# Patient Record
Sex: MALE | Race: OTHER | Employment: UNEMPLOYED | URBAN - METROPOLITAN AREA
[De-identification: names, ages, dates, MRNs, and addresses within clinical notes are randomized per-mention and may not be internally consistent; named-entity substitution may affect disease eponyms.]

---

## 2017-04-18 ENCOUNTER — HOSPITAL ENCOUNTER (EMERGENCY)
Facility: HOSPITAL | Age: 47
Discharge: HOME | End: 2017-04-18
Attending: EMERGENCY MEDICINE

## 2017-04-18 VITALS
WEIGHT: 168 LBS | SYSTOLIC BLOOD PRESSURE: 144 MMHG | DIASTOLIC BLOOD PRESSURE: 80 MMHG | RESPIRATION RATE: 19 BRPM | HEART RATE: 88 BPM | TEMPERATURE: 98 F | OXYGEN SATURATION: 97 %

## 2017-04-18 DIAGNOSIS — K29.00 ACUTE GASTRITIS WITHOUT HEMORRHAGE, UNSPECIFIED GASTRITIS TYPE: Primary | ICD-10-CM

## 2017-04-18 PROCEDURE — 80048 BASIC METABOLIC PNL TOTAL CA: CPT | Performed by: EMERGENCY MEDICINE

## 2017-04-18 PROCEDURE — 93010 ELECTROCARDIOGRAM REPORT: CPT | Performed by: EMERGENCY MEDICINE

## 2017-04-18 PROCEDURE — 96374 THER/PROPH/DIAG INJ IV PUSH: CPT

## 2017-04-18 PROCEDURE — 99285 EMERGENCY DEPT VISIT HI MDM: CPT

## 2017-04-18 PROCEDURE — 80076 HEPATIC FUNCTION PANEL: CPT | Performed by: EMERGENCY MEDICINE

## 2017-04-18 PROCEDURE — 85025 COMPLETE CBC W/AUTO DIFF WBC: CPT | Performed by: EMERGENCY MEDICINE

## 2017-04-18 PROCEDURE — 93005 ELECTROCARDIOGRAM TRACING: CPT

## 2017-04-18 PROCEDURE — 84484 ASSAY OF TROPONIN QUANT: CPT | Performed by: EMERGENCY MEDICINE

## 2017-04-18 RX ORDER — PHENYTOIN SODIUM 100 MG/1
300 CAPSULE, EXTENDED RELEASE ORAL ONCE
Status: COMPLETED | OUTPATIENT
Start: 2017-04-18 | End: 2017-04-18

## 2017-04-18 RX ORDER — LEVETIRACETAM 500 MG/1
500 TABLET ORAL DAILY
Status: ON HOLD | COMMUNITY
End: 2018-05-01

## 2017-04-18 RX ORDER — PHENYTOIN SODIUM 100 MG/1
200 CAPSULE, EXTENDED RELEASE ORAL 2 TIMES DAILY
Qty: 100 CAPSULE | Refills: 0 | Status: ON HOLD | OUTPATIENT
Start: 2017-04-18 | End: 2017-06-08

## 2017-04-18 RX ORDER — LEVETIRACETAM 500 MG/1
500 TABLET ORAL ONCE
Status: COMPLETED | OUTPATIENT
Start: 2017-04-18 | End: 2017-04-18

## 2017-04-18 RX ORDER — ONDANSETRON 4 MG/1
4 TABLET, ORALLY DISINTEGRATING ORAL EVERY 4 HOURS PRN
Qty: 10 TABLET | Refills: 0 | Status: SHIPPED | OUTPATIENT
Start: 2017-04-18 | End: 2017-04-25

## 2017-04-18 RX ORDER — ONDANSETRON 2 MG/ML
4 INJECTION INTRAMUSCULAR; INTRAVENOUS ONCE
Status: COMPLETED | OUTPATIENT
Start: 2017-04-18 | End: 2017-04-18

## 2017-04-18 RX ORDER — PHENYTOIN 125 MG/5ML
300 SUSPENSION ORAL ONCE
Status: DISCONTINUED | OUTPATIENT
Start: 2017-04-18 | End: 2017-04-18

## 2017-04-18 RX ORDER — PANTOPRAZOLE SODIUM 20 MG/1
20 TABLET, DELAYED RELEASE ORAL DAILY
Qty: 30 TABLET | Refills: 0 | Status: SHIPPED | OUTPATIENT
Start: 2017-04-18 | End: 2017-05-18

## 2017-04-18 NOTE — ED PROVIDER NOTES
Patient Seen in: [facility] Emergency Department    History   Patient presents with:  Nausea/Vomiting/Diarrhea (gastrointestinal)    Stated Complaint:     The history is provided by the patient and a relative.        Is a 17-year-old male in the em for seizures, weakness and headaches. Psychiatric/Behavioral: Negative for suicidal ideas and agitation. Positive for stated complaint:   Other systems are as noted in HPI. Constitutional and vital signs reviewed.       All other systems reviewed a Phosphatase 101 (*)     Bilirubin, Direct 0.3 (*)     All other components within normal limits   CBC W/ DIFFERENTIAL - Abnormal; Notable for the following:     RBC 4.11 (*)     HCT 40.7 (*)     MCH 34.2 (*)     PLT 86 (*)     Neutrophil Absolute 7.9 (*) visit.     Follow-up:  Giovanni Matias MD  [address] [facility], [address]  [place] [address] [phone]    Call in 2 days        Medications Prescribed:  Current Discharge Medication List    START taking these medications    ondansetron 4 MG O

## 2017-04-18 NOTE — ED INITIAL ASSESSMENT (HPI)
N/v x24 hours. Pt denies abdominal or chest pain. He states he feels \"warm\" at times, with no documented fever.

## 2017-05-02 NOTE — ED PROVIDER NOTES
Pharmacy called for Rx verification on 17. Rx called in for dilantin extended 30 m tabs in am, 3 tabs in evening as originally written by dr mcallister.

## 2017-06-07 ENCOUNTER — APPOINTMENT (OUTPATIENT)
Dept: GENERAL RADIOLOGY | Facility: HOSPITAL | Age: 47
DRG: 896 | End: 2017-06-07
Attending: EMERGENCY MEDICINE

## 2017-06-07 PROBLEM — F10.239 ALCOHOL WITHDRAWAL (HCC): Status: ACTIVE | Noted: 2017-06-07

## 2017-06-07 PROBLEM — K85.20 ALCOHOL-INDUCED ACUTE PANCREATITIS WITHOUT INFECTION OR NECROSIS: Status: ACTIVE | Noted: 2017-06-07

## 2017-06-07 PROBLEM — R78.89 DILANTIN LEVEL TOO LOW: Status: ACTIVE | Noted: 2017-06-07

## 2017-06-07 PROBLEM — F10.230 ALCOHOL WITHDRAWAL, UNCOMPLICATED (HCC): Status: ACTIVE | Noted: 2017-06-07

## 2017-06-07 PROCEDURE — 71010 XR CHEST AP PORTABLE  (CPT=71010): CPT | Performed by: EMERGENCY MEDICINE

## 2017-06-07 NOTE — ED PROVIDER NOTES
Patient Seen in: [facility] AND [facility] Emergency Department    History   Patient presents with:  Seizure Disorder (neurologic)    Stated Complaint: Seizure like activity    HPI    is here with his wife they are concerned that he is going to have a seizure. Pulse 06/07/17 1537 123   Resp 06/07/17 1537 28   Temp 06/07/17 1537 98.1 °F (36.7 °C)   Temp src 06/07/17 1537 Oral   SpO2 06/07/17 1537 98 %   O2 Device 06/07/17 1537 None (Room air)       Current:/78 mmHg  Pulse 85  Temp(Src) 98.1 °F (36.7 °C) ( Notable for the following:     Glucose 117 (*)     Sodium 135 (*)     Potassium 3.2 (*)     CO2 16 (*)      (*)     Bilirubin, Total 1.9 (*)     Total Protein 8.7 (*)     Globulin 4.6 (*)     A/G Ratio 0.9 (*)     Anion Gap 20 (*)     BUN/CREA Ratio level too low  Alcohol-induced acute pancreatitis without infection or necrosis    Disposition:  There is no disposition on file for this visit.     Follow-up:  Brianne Davila MD  [address] [place] [address]  [phone]    In 2

## 2017-06-07 NOTE — CONSULTS
REFERRING PHYSICIAN: Dr. Lang ref. provider found    HPI:         Thank you very much for requesting me to see the patient. hx per chart. Pt seen in er.      As you know, Dm Christensen is a 50year old male who presents today to ER - -was brought to ER by family Gastroenterology.   ___________________________________________________________  #

## 2017-06-07 NOTE — H&P
[facility] Hospitalist H&P       CC: Patient presents with:  Seizure Disorder (neurologic)       PCP: None Pcp    ASSESSMENT / PLAN:    Patient is a 50year old male who is Luxembourgish speaking with a [facility] sig for alcohol dependence, HTN, TBI with some short term camilo episode of tremor today (no LOC, did not look like \"full seizure\" no loss bowel or bladder) and EMS called at that time. Last drink today. +chronic diarrhea at baseline.        PMH  Past Medical History   Diagnosis Date   • Essential hypertension    • TBI GFRNAA  >60   CA  9.2   NA  135*   K  3.2*   CL  99   CO2  16*       Lab Results  Component Value Date   WBC 6.8 06/07/2017   HGB 14.5 06/07/2017   HCT 41.4 06/07/2017   PLT 68 06/07/2017   CREATSERUM 0.91 06/07/2017   BUN 9 06/07/2017    06/07/201

## 2017-06-08 ENCOUNTER — APPOINTMENT (OUTPATIENT)
Dept: ULTRASOUND IMAGING | Facility: HOSPITAL | Age: 47
DRG: 896 | End: 2017-06-08
Attending: INTERNAL MEDICINE

## 2017-06-08 PROCEDURE — 76705 ECHO EXAM OF ABDOMEN: CPT | Performed by: INTERNAL MEDICINE

## 2017-06-08 NOTE — PLAN OF CARE
Problem: Patient/Family Goals  Goal: Patient/Family Long Term Goal  Patient’s Long Term Goal: no more seizures    Interventions:   Take medications as instructed  - See additional Care Plan goals for specific interventions   Outcome: Progressing  Take medic Monitor swallowing and airway patency with patient fatigue and changes in neurological status  - Encourage and assist patient to increase activity and self care with guidance from PT/OT  - Encourage visually impaired, hearing impaired and aphasic patients injury  INTERVENTIONS:  - Assess pt frequently for physical needs  - Identify cognitive and physical deficits and behaviors that affect risk of falls.   - [place] fall precautions as indicated by assessment.  - Educate pt/family on patient safety includin understanding and response  - Establish method for patient to ask for assistance (call light)  - Provide an  as needed  - Communicate barriers and strategies to overcome with those who interact with patient   Outcome: Progressing  Patient is [place]

## 2017-06-08 NOTE — PROGRESS NOTES
[facility] Hospitalist Progress Note     CC: Hospital Follow up    PCP: None Pcp, can likely follow-up at [facility] [place]       Assessment/Plan:   Patient is a 50year old male who is Faroese speaking with a [facility] sig for alcohol dependence, HTN, TBI with some short pending clinical course    Patient and/or patient's family given opportunity to ask questions and note understanding and agree with therapeutic plan as outlined    Niesha Chandra DO    [facility] Hospitalist  Answering Service number: [phone]     Subjective: AST  107*  77*   ALB  4.1  3.5         Imaging:  Xr Chest Ap Portable  (cpt=71010)    6/7/2017  CONCLUSION: No acute cardiopulmonary abnormality.            Meds:     • levETIRAcetam  500 mg Intravenous Q24H   • phenytoin  200 mg Intravenous QAM   • pheny

## 2017-06-08 NOTE — PLAN OF CARE
Problem: METABOLIC/FLUID AND ELECTROLYTES - ADULT  Goal: Glucose maintained within prescribed range  INTERVENTIONS:  - Monitor Blood Glucose as ordered  - Assess for signs and symptoms of hyperglycemia and hypoglycemia  - Administer ordered medications to devices as appropriate  - Consider OT/PT consult to assist with strengthening/mobility  - Encourage toileting schedule  Outcome: Progressing    Problem: DISCHARGE PLANNING  Goal: Discharge to home or other facility with appropriate resources  INTERVENTIONS knowledge, values, beliefs, and cultural backgrounds into the planning and delivery of care  - Encourage patient/family to participate in care and decision-making at the level they choose  - Honor patient and family perspectives and choices  Outcome: Progr

## 2017-06-08 NOTE — PROGRESS NOTES
GI  PROGRESS NOTE    SUBJECTIVE: denies abd pain; tolerating diet.        OBJECTIVE:  Temp:  [97.8 °F (36.6 °C)-98.7 °F (37.1 °C)] 97.9 °F (36.6 °C)  Pulse:  [] 93  Resp:  [18-26] 18  BP: (119-153)/(71-84) 133/71 mmHg  Exam  Gen: No acute distress,

## 2017-06-09 NOTE — DISCHARGE PLANNING
SW provided pt w/ ETOH resources. Pt's wife to go over resources w/ pt. Pt does have number to [facility] per RN.     [place] , [address]

## 2017-06-09 NOTE — DISCHARGE SUMMARY
General Medicine Discharge Summary     Patient ID:  Juan Carlos Black  50year old  3/23/1970    Admit date: 6/7/2017    Discharge date and time: 06/09/2017    Attending Physician: Kacy Springer MD     Primary Care Physician: None Pcp     Reason for admissi seizure though this is always possible with missed doses of sz meds    Chest pain- atypical, likely due to esophageal pain due to vomiting -- improved  -pt states pain was worse with vomiting, felt like a burning, radiated from epigastric region  -trop and Commonly known as:  PROTONIX   Take 1 tablet (40 mg total) by mouth every morning before breakfast.         CHANGE how you take these medications          Phenytoin Sodium Extended 100 MG Caps   Commonly known as:  TREASURE   What changed:  Another medic

## 2017-06-09 NOTE — PROGRESS NOTES
[facility] Pharmacy Note: Route Optimization for Levetiracetam (KEPPRA)    Patient is currently on Levetiracetam (KEPPRA) 500 mg IV every 24 hours.    The patient meets the criteria to convert to the oral equivalent as established by the IV to Oral conversion prot

## 2017-06-09 NOTE — PLAN OF CARE
Problem: Patient/Family Goals  Goal: Patient/Family Long Term Goal  Patient’s Long Term Goal: no more seizures    Interventions:   Take medications as instructed  - See additional Care Plan goals for specific interventions   Outcome: Progressing  Take medic patency with patient fatigue and changes in neurological status  - Encourage and assist patient to increase activity and self care with guidance from PT/OT  - Encourage visually impaired, hearing impaired and aphasic patients to use assistive/communication frequently for physical needs  - Identify cognitive and physical deficits and behaviors that affect risk of falls.   - [place] fall precautions as indicated by assessment.  - Educate pt/family on patient safety including physical limitations  - Instruct p time for understanding and response  - Establish method for patient to ask for assistance (call light)  - Provide an  as needed  - Communicate barriers and strategies to overcome with those who interact with patient   Outcome: Progressing  Patie

## 2017-06-09 NOTE — PLAN OF CARE
Problem: Patient/Family Goals  Goal: Patient/Family Long Term Goal  Patient’s Long Term Goal: no more seizures    Interventions:   Take medications as instructed  - See additional Care Plan goals for specific interventions   Outcome: Progressing  Goal: Radha visually impaired, hearing impaired and aphasic patients to use assistive/communication devices   Outcome: Progressing    Problem: METABOLIC/FLUID AND ELECTROLYTES - ADULT  Goal: Glucose maintained within prescribed range  INTERVENTIONS:  - Monitor Blood G call for assistance with activity based on assessment  - Modify environment to reduce risk of injury  - Provide assistive devices as appropriate  - Consider OT/PT consult to assist with strengthening/mobility  - Encourage toileting schedule   Outcome: Prog care for you?  - Provide timely, complete, and accurate information to patient/family  - Incorporate patient and family knowledge, values, beliefs, and cultural backgrounds into the planning and delivery of care  - Encourage patient/family to participate i

## 2017-07-27 ENCOUNTER — HOSPITAL ENCOUNTER (EMERGENCY)
Facility: HOSPITAL | Age: 47
Discharge: HOME | End: 2017-07-27
Attending: EMERGENCY MEDICINE

## 2017-07-27 VITALS
SYSTOLIC BLOOD PRESSURE: 118 MMHG | OXYGEN SATURATION: 95 % | DIASTOLIC BLOOD PRESSURE: 75 MMHG | WEIGHT: 132.25 LBS | HEIGHT: 62.99 IN | BODY MASS INDEX: 23.43 KG/M2 | RESPIRATION RATE: 22 BRPM | HEART RATE: 97 BPM | TEMPERATURE: 98 F

## 2017-07-27 DIAGNOSIS — R78.89 DILANTIN LEVEL TOO LOW: ICD-10-CM

## 2017-07-27 DIAGNOSIS — G40.909 SEIZURE DISORDER (HCC): Primary | ICD-10-CM

## 2017-07-27 LAB
ANION GAP SERPL CALC-SCNC: 17 MMOL/L (ref 0–18)
BASOPHILS # BLD: 0 K/UL (ref 0–0.2)
BASOPHILS NFR BLD: 1 %
BUN SERPL-MCNC: 10 MG/DL (ref 8–20)
BUN/CREAT SERPL: 7.7 (ref 10–20)
CALCIUM SERPL-MCNC: 9 MG/DL (ref 8.5–10.5)
CHLORIDE SERPL-SCNC: 99 MMOL/L (ref 95–110)
CO2 SERPL-SCNC: 17 MMOL/L (ref 22–32)
CREAT SERPL-MCNC: 1.3 MG/DL (ref 0.5–1.5)
EOSINOPHIL # BLD: 0 K/UL (ref 0–0.7)
EOSINOPHIL NFR BLD: 0 %
ERYTHROCYTE [DISTWIDTH] IN BLOOD BY AUTOMATED COUNT: 14 % (ref 11–15)
GLUCOSE BLDC GLUCOMTR-MCNC: 143 MG/DL (ref 70–99)
GLUCOSE SERPL-MCNC: 126 MG/DL (ref 70–99)
HCT VFR BLD AUTO: 44.5 % (ref 41–52)
HGB BLD-MCNC: 15.5 G/DL (ref 13.5–17.5)
LYMPHOCYTES # BLD: 1.7 K/UL (ref 1–4)
LYMPHOCYTES NFR BLD: 22 %
MCH RBC QN AUTO: 33.7 PG (ref 27–32)
MCHC RBC AUTO-ENTMCNC: 34.9 G/DL (ref 32–37)
MCV RBC AUTO: 96.5 FL (ref 80–100)
MONOCYTES # BLD: 0.6 K/UL (ref 0–1)
MONOCYTES NFR BLD: 8 %
NEUTROPHILS # BLD AUTO: 5.3 K/UL (ref 1.8–7.7)
NEUTROPHILS NFR BLD: 69 %
OSMOLALITY UR CALC.SUM OF ELEC: 277 MOSM/KG (ref 275–295)
PHENYTOIN SERPL-MCNC: <2.5 MCG/ML (ref 10–20)
PLATELET # BLD AUTO: 98 K/UL (ref 140–400)
PMV BLD AUTO: 10.3 FL (ref 7.4–10.3)
POTASSIUM SERPL-SCNC: 3.6 MMOL/L (ref 3.3–5.1)
RBC # BLD AUTO: 4.61 M/UL (ref 4.5–5.9)
SODIUM SERPL-SCNC: 133 MMOL/L (ref 136–144)
WBC # BLD AUTO: 7.8 K/UL (ref 4–11)

## 2017-07-27 PROCEDURE — 85025 COMPLETE CBC W/AUTO DIFF WBC: CPT

## 2017-07-27 PROCEDURE — 99285 EMERGENCY DEPT VISIT HI MDM: CPT

## 2017-07-27 PROCEDURE — 85025 COMPLETE CBC W/AUTO DIFF WBC: CPT | Performed by: EMERGENCY MEDICINE

## 2017-07-27 PROCEDURE — 80185 ASSAY OF PHENYTOIN TOTAL: CPT | Performed by: EMERGENCY MEDICINE

## 2017-07-27 PROCEDURE — 96365 THER/PROPH/DIAG IV INF INIT: CPT

## 2017-07-27 PROCEDURE — 93010 ELECTROCARDIOGRAM REPORT: CPT | Performed by: INTERNAL MEDICINE

## 2017-07-27 PROCEDURE — 82962 GLUCOSE BLOOD TEST: CPT

## 2017-07-27 PROCEDURE — 80048 BASIC METABOLIC PNL TOTAL CA: CPT

## 2017-07-27 PROCEDURE — 93005 ELECTROCARDIOGRAM TRACING: CPT

## 2017-07-27 PROCEDURE — 80048 BASIC METABOLIC PNL TOTAL CA: CPT | Performed by: EMERGENCY MEDICINE

## 2017-07-27 RX ORDER — LORAZEPAM 2 MG/ML
INJECTION INTRAMUSCULAR
Status: COMPLETED
Start: 2017-07-27 | End: 2017-07-27

## 2017-07-27 RX ORDER — PHENYTOIN SODIUM 100 MG/1
100 CAPSULE, EXTENDED RELEASE ORAL 3 TIMES DAILY
Qty: 270 CAPSULE | Refills: 1 | Status: SHIPPED | OUTPATIENT
Start: 2017-07-27 | End: 2017-10-25

## 2017-07-27 RX ORDER — PHENYTOIN SODIUM 100 MG/1
100 CAPSULE, EXTENDED RELEASE ORAL 3 TIMES DAILY
Qty: 20 CAPSULE | Refills: 0 | Status: ON HOLD | OUTPATIENT
Start: 2017-07-27 | End: 2018-05-01

## 2017-07-27 NOTE — ED PROVIDER NOTES
Patient Seen in: [facility] Emergency Department    History   Patient presents with:  Arrythmia/Palpitations (cardiovascular)    Stated Complaint:     HPI    47-year-old male patient with a past medical history significant for seizure disorder pre noted above. PSFH elements reviewed from today and agreed except as otherwise stated in HPI.     Physical Exam   ED Triage Vitals [07/27/17 1333]  BP: (!) 136/103  Pulse: 119  Resp: 22  Temp: 97.7 °F (36.5 °C)  Temp src: Temporal  SpO2: 99 %  O2 Device: Abnormal            Final result                 Please view results for these tests on the individual orders.    RAINBOW DRAW BLUE   RAINBOW DRAW GOLD   RAINBOW DRAW DARK GREEN   RAINBOW DRAW LAVENDER TALL (BNP)     EKG    Rate, intervals and axes as noted

## 2017-07-27 NOTE — ED INITIAL ASSESSMENT (HPI)
Pt to ED c/o palpitations, anxiety and tachycardia. Pt reports he hasn't taken seizure meds in 15 days. Denies ETOH use. Pt reports he feels like he might have a seizure.

## 2018-04-27 ENCOUNTER — APPOINTMENT (OUTPATIENT)
Dept: GENERAL RADIOLOGY | Facility: HOSPITAL | Age: 48
DRG: 392 | End: 2018-04-27
Attending: EMERGENCY MEDICINE
Payer: MEDICAID

## 2018-04-27 PROBLEM — F10.20 ALCOHOLISM (HCC): Status: ACTIVE | Noted: 2018-04-27

## 2018-04-27 PROBLEM — E83.42 HYPOMAGNESEMIA: Status: ACTIVE | Noted: 2018-04-27

## 2018-04-27 PROBLEM — E87.6 HYPOKALEMIA: Status: ACTIVE | Noted: 2018-04-27

## 2018-04-27 PROBLEM — R56.9 ALCOHOL WITHDRAWAL SEIZURE WITHOUT COMPLICATION (HCC): Status: ACTIVE | Noted: 2018-04-27

## 2018-04-27 PROBLEM — Z91.14 H/O MEDICATION NONCOMPLIANCE: Status: ACTIVE | Noted: 2018-04-27

## 2018-04-27 PROBLEM — F10.230 ALCOHOL WITHDRAWAL SEIZURE WITHOUT COMPLICATION (HCC): Status: ACTIVE | Noted: 2018-04-27

## 2018-04-27 PROCEDURE — 71045 X-RAY EXAM CHEST 1 VIEW: CPT | Performed by: EMERGENCY MEDICINE

## 2018-04-27 NOTE — ED NOTES
Pt began to have a seizure while I was in the room. Medications given at this time. Seizure precaution were in place from when he first arrived.

## 2018-04-27 NOTE — CONSULTS
[facility]  Report of GI Consultation    Dia Balderrama Patient Status:  Emergency    3/23/1970 MRN [id number]   Location [address] [facility] Attending Martha Orozco MD   Hosp Day # 0 PCP None Pcp     Date of Admission suffered at least 2 seizures since arrival in the ED today, total of 3 seizures today. Wife states this is very unusual.    GI service is consulted for recent history of frequent daily vomiting.     ED data:    Labs today show normal renal function, CO2 20 Guardian Status:  Not on file.     Other Topics            Concern    None on file    Social History Narrative    None on file            Current Medications:    Current Facility-Administered Medications:  potassium chloride IVPB premix 20 mEq 20 mEq Priscilla Kourtney Chest Ap Portable  (cpt=71045)    Result Date: 4/27/2018  CONCLUSION: No acute cardiopulmonary abnormality.    Dictated by (CST): Gisela Barnett MD on 4/27/2018 at 13:42     Approved by (CST): Gisela Barnett MD on 4/27/2018 at 13:42

## 2018-04-27 NOTE — ED PROVIDER NOTES
Patient Seen in: [facility] Emergency Department    History   Patient presents with:  Fever (infectious)    Stated Complaint: [address]    HPI    Patient presents with complaint of according to his wife she was concerned he might have a seiz noted above. PSFH elements reviewed from today and agreed except as otherwise stated in HPI.     Physical Exam   ED Triage Vitals [04/27/18 1229]  BP: 136/89  Pulse: 103  Resp: 20  Temp: 100.5 °F (38.1 °C)  Temp src: Tympanic  SpO2: 90 %  O2 Device: None no recurrence at this point. Patient has no physician here. Patient later had another seizure he was given 2 more milligrams of IV Ativan. I did discuss with Dr. Dale Feng he accepted patient for admission.   He requested neurology consultation Dr. Evaristo Jim Abnormality         Status                     ---------                               -----------         ------                     CBC W/ DIFFERENTIAL[[id number]]          Abnormal            Final result                 Please view results for

## 2018-04-27 NOTE — CONSULTS
Neurology Inpatient Consult Note    Timmy Shone : 3/23/1970   Referring Physician: Dr. Janae Ramirez  HPI:     Timmy Shone is a 46year old male who is being seen in neurologic evaluation. Patient presented to the emergency room today.   Wife witnessed a throughout (individual muscle strength testing difficult to do due to mental state)  Sensory: Unable to accurately assess  Reflexes: DTRs 2+ in bilateral biceps, brachioradialis, patella  Coordination / gait: Unable to assess    IMAGING / STUDIES:  reviewe

## 2018-04-27 NOTE — ED INITIAL ASSESSMENT (HPI)
SHAKING LIKE A SEIZURE AT HOME PER WIFE, LAST NIGHT VOMITING, TODAY C/O FEELING ILL, + FEVER, NO SEIZURE LIKE ACTIVITY NOW

## 2018-04-27 NOTE — H&P
[facility]    PATIENT'S NAME: Linh Becker PHYSICIAN: Anayeli Lee MD   PATIENT ACCOUNT#:   [id number]    LOCATION:  [place] [address]  MEDICAL RECORD #:   [id number]       YOB: 1970  ADMISSION DATE:       04/27/2018 medications for almost 1 month. He continued to drink 10 to 12 beers a day. Patient has been having intractable nausea and vomiting but he continued to manage to drink alcohol. Other 12-point review of systems is unobtainable.          PHYSICAL EXAMINATI

## 2018-04-27 NOTE — ED NOTES
Witnessed seizure from 7669-6301 : 15L NRB mask placed and pt medicated with 2mg IV Ativan as ordered. Body is rigid and head is hyperextended to the left, eyes rolled back.  Wife at bedside during event - states \"this is unlike any seizure I have ever see

## 2018-04-28 NOTE — PROGRESS NOTES
[facility]    Progress Note    Elizabeth Araujo Patient Status:  Inpatient    3/23/1970 MRN [id number]   Location [facility] 3W/SW Attending Rochelle Martins MD   Hosp Day # 1 PCP None Pcp       Subjective:   Elizabeth Araujo is a(n) 48 year deficits  HEENT: denies nasal congestion, sinus pain or sore throat; hearing loss negative  RESPIRATORY: denies shortness of breath, wheezing or cough   CARDIOVASCULAR: denies chest pain or MARTINEZ; no palpitations   GI: denies nausea, vomiting, constipation,

## 2018-04-28 NOTE — PLAN OF CARE
CARDIOVASCULAR - ADULT    • Maintains optimal cardiac output and hemodynamic stability Progressing    • Absence of cardiac arrhythmias or at baseline Progressing        Integumentary status not within defined limits    • Pt's integumentary status will be a

## 2018-04-28 NOTE — PROGRESS NOTES
[facility]  Hospitalist Progress  Note     Foster Amparo Patient Status:  Inpatient    3/23/1970  46year old CSN [id number]   Location -A Attending Luis Akins MD   Hosp Day # 1 PCP None Pcp     ASSESSMENT/PLAN    Seizures.   Due 34.8  34.8   RDW  15.3*  14.9   WBC  6.1  8.4   PLT  74*  53*     Recent Labs   Lab  04/27/18   1239  04/28/18   0437   GLU  111*  133*   BUN  10  4*   CREATSERUM  0.94  0.87   GFRAA  >60  >60   GFRNAA  >60  >60   CA  8.6  7.7*   ALB  4.0  3.5   NA  138  1

## 2018-04-28 NOTE — PLAN OF CARE
Problem: Patient/Family Goals  Goal: Patient/Family Long Term Goal  Patient's Long Term Goal: neurologic status returns to baseline     Interventions:  - iv fluids, ciwa.  Reorientation   - See additional Care Plan goals for specific interventions    Outcom Encourage broncho-pulmonary hygiene including cough, deep breathe, Incentive Spirometry  - Assess the need for suctioning and perform as needed  - Assess and instruct to report SOB or any respiratory difficulty  - Respiratory Therapy support as indicated patient fatigue and changes in neurological status  - Encourage and assist patient to increase activity and self care with guidance from PT/OT  - Encourage visually impaired, hearing impaired and aphasic patients to use assistive/communication devices   Ou

## 2018-04-29 NOTE — PHYSICAL THERAPY NOTE
Pt order recived and chart reviewed. Per nursing to hold Pt eval today wit pt confused, unable to follow command, unsteady, high fall risk on CIWA protocol. He is given Ativan round the clock. Will follow up tomorrow.

## 2018-04-29 NOTE — PROGRESS NOTES
[facility]  Hospitalist Progress  Note     Sheron Lawrence Patient Status:  Inpatient    3/23/1970  46year old [facility] [id number]   Location -A Attending Solomon Cantu MD   Hosp Day # 2 PCP None Pcp     ASSESSMENT/PLAN    Seizures.   Due 99.1   MCH  34.5*  34.5*  34.7*   MCHC  34.8  34.8  35.0   RDW  15.3*  14.9  14.1   WBC  6.1  8.4  6.2   PLT  74*  53*  46*     Recent Labs   Lab  04/27/18   1239  04/28/18   0437  04/28/18   1533  04/29/18   0549   GLU  111*  133*   --   98   BUN  10  4*

## 2018-04-30 ENCOUNTER — ANESTHESIA EVENT (OUTPATIENT)
Dept: ENDOSCOPY | Facility: HOSPITAL | Age: 48
DRG: 392 | End: 2018-04-30
Payer: MEDICAID

## 2018-04-30 ENCOUNTER — ANESTHESIA (OUTPATIENT)
Dept: ENDOSCOPY | Facility: HOSPITAL | Age: 48
DRG: 392 | End: 2018-04-30
Payer: MEDICAID

## 2018-04-30 ENCOUNTER — SURGERY (OUTPATIENT)
Age: 48
End: 2018-04-30

## 2018-04-30 RX ORDER — LIDOCAINE HYDROCHLORIDE 10 MG/ML
INJECTION, SOLUTION EPIDURAL; INFILTRATION; INTRACAUDAL; PERINEURAL AS NEEDED
Status: DISCONTINUED | OUTPATIENT
Start: 2018-04-30 | End: 2018-04-30 | Stop reason: SURG

## 2018-04-30 RX ADMIN — LIDOCAINE HYDROCHLORIDE 100 MG: 10 INJECTION, SOLUTION EPIDURAL; INFILTRATION; INTRACAUDAL; PERINEURAL at 16:29:00

## 2018-04-30 RX ADMIN — SODIUM CHLORIDE: 9 INJECTION, SOLUTION INTRAVENOUS at 16:25:00

## 2018-04-30 NOTE — ANESTHESIA PREPROCEDURE EVALUATION
Anesthesia PreOp Note    HPI:     Fredy Bennett is a 46year old male who presents for preoperative consultation requested by: Ester Alarcon MD    Date of Surgery: 4/27/2018 - 4/30/2018    Procedure(s):  ESOPHAGOGASTRODUODENOSCOPY (EGD)  Indicati MD PACHECO 1 tablet at 04/30/18 5470   Thiamine HCl tab 100 mg 100 mg Oral Daily Michael BERGMAN  mg at 04/30/18 9412   levETIRAcetam (KEPPRA) tab 750 mg 750 mg Oral BID Michael BERGMAN  mg at 04/30/18 4757   Pantoprazole Sodium (PROTONIX) EC tab 40 m (L) 04/30/2018   MCV 99.5 04/30/2018   MCH 35.1 (H) 04/30/2018   MCHC 35.3 04/30/2018   RDW 14.2 04/30/2018   PLT 42 (L) 04/30/2018   MPV 9.3 04/30/2018       Lab Results  Component Value Date    (L) 04/30/2018   K 3.3 04/30/2018    04/30/2018 risks, major complications, and any alternative forms of anesthetic management. All of the patient's questions were answered to the best of my ability. The patient desires the anesthetic management as planned.   Vernard Ahumada  4/30/2018 4:22 PM

## 2018-04-30 NOTE — OPERATIVE REPORT
EGD PROCEDURE REPORT    DATE OF PROCEDURE:  4/30/2018    PCP: None Pcp     PREOPERATIVE DIAGNOSIS:  Nausea and vomiting; abnormal weight loss     POSTOPERATIVE DIAGNOSIS:  See impression. SURGEON:  Christopher A. Lyell Darner, M.D. Myrene CanavanCaren Brock anest

## 2018-04-30 NOTE — ANESTHESIA POSTPROCEDURE EVALUATION
Patient: Jett Arteaga    Procedure Summary     Date:  04/30/18 Room / Location:  [facility] ENDOSCOPY 01 / [facility] ENDOSCOPY    Anesthesia Start:  0418 Anesthesia Stop:  5477    Procedure:  ESOPHAGOGASTRODUODENOSCOPY (EGD) (N/A ) Diagnosis:  (non-erosive gastritis and

## 2018-04-30 NOTE — PROGRESS NOTES
[facility] - [facility]  Hospitalist Progress  Note     Elsa Aggarwal Patient Status:  Inpatient    3/23/1970  46year old CSN [id number]   Location -A Attending Dee Salas MD   Hosp Day # 3 PCP None Pcp     ASSESSMENT/PLAN    Seizures.   Due 11.0*   HCT  33.3*  33.0*  31.3*   MCV  99.3  99.1  99.5   MCH  34.5*  34.7*  35.1*   MCHC  34.8  35.0  35.3   RDW  14.9  14.1  14.2   WBC  8.4  6.2  5.7   PLT  53*  46*  42*     Recent Labs   Lab  04/28/18   0437   04/29/18   0549  04/29/18   1549  04/30

## 2018-04-30 NOTE — PROGRESS NOTES
[place] Patient Status:  Inpatient    3/23/1970 MRN [id number]   Location [facility] 3W/SW Attending Ace Ross MD   Hosp Day # 3 PCP None Pcp       Subjective:     Awake, seems latanya BILT  1.9*  2.4*   --   2.5*   --    --    TP  8.1  7.5   --   7.2   --    --     < > = values in this interval not displayed.        Recent Labs   Lab  04/27/18   1239   LIP  47       Recent Labs   Lab  04/28/18   0437  04/29/18   0549  04/30/18   0530

## 2018-04-30 NOTE — PHYSICAL THERAPY NOTE
PHYSICAL THERAPY EVALUATION - INPATIENT     Room Number: 332/332-A  Evaluation Date: 4/30/2018  Type of Evaluation: Initial   Physician Order: PT Eval and Treat    Presenting Problem: ETOH withdrawal   seizures   Reason for Therapy: Mobility Dysfunction a Patient's current functional deficits include decrease activity tolerance / decrease balance and need for assisted mobility. Pt with SBA for bed mobility. Pt with SBA to CGA for guiding and lines for transfers.   Pt able to amb without AD  150 ft x 1  30 Patient presented to the emergency room today. Wife witnessed a seizure. Patient has chronic alcoholism and alcohol withdrawal seizures. He is not in any anti epileptics. He was tremulous prior to the seizure.   He had 2 seizures in the ED on presentati Drives:  (does not have a license per spouse can drive )  Patient Owned Equipment: None  Patient Regularly Uses: None    Prior Level of [place]:     pt is Bengali speaking. NO family was present .    Wife Linda Dawson called as pt unable to provide PLF / After activity  BP  126/83  HR  88  O2 sats  99 %     AM-PAC '6-Clicks' INPATIENT SHORT FORM - BASIC MOBILITY  How much difficulty does the patient currently have. ..  -   Turning over in bed (including adjusting bedclothes, sheets and blankets)?: None   - Goal #4 Patient will negotiate 1  stairs/one curb w/ assistive device and supervision   Goal #4   Current Status    Goal #5 Patient to demonstrate independence with home activity/exercise instructions provided to patient in preparation for discharge.    [facility][note 3]

## 2018-04-30 NOTE — PROGRESS NOTES
[place] Patient Status:  Inpatient    3/23/1970 MRN [id number]   Location [facility] 3W/SW Attending Ace Ross MD   Hosp Day # 2 PCP None Pcp       Subjective:     Awake, seems latanya Recent Labs   Lab  04/27/18   1239  04/28/18   0437  04/29/18   0549   MG  1.4*  1.9  1.7*   PHOS   --    --   2.3*       No results for input(s): URINE, CULTI, BLDSMR in the last 72 hours.               Assessment and Plan:     47-year-old gentleman

## 2018-04-30 NOTE — CM/SW NOTE
4/30/18 CM Discharge planning  Spoke with RN,requested order for psych liaison to eval pt and assist  for ETOH resources.   James Blake X [phone]

## 2018-05-01 NOTE — PLAN OF CARE
Maintains optimal cardiac output and hemodynamic stability Adequate for Discharge      Absence of cardiac arrhythmias or at baseline Adequate for Discharge      Pt's integumentary status will be adequate for discharge Adequate for Discharge      Achieves s

## 2018-05-01 NOTE — DISCHARGE SUMMARY
[place] HOSPITALIST  DISCHARGE SUMMARY     Mireya Lucero Patient Status:  Inpatient    3/23/1970 MRN [id number]   Location [facility] 3W/SW Attending No att. providers found   [address] Day # 4 PCP None Pcp     DATE OF ADMISSION: 2018  DATE OF DIS EXAM:  Temp:  [98 °F (36.7 °C)-99.5 °F (37.5 °C)] 98.3 °F (36.8 °C)  Pulse:  [60-75] 75  Resp:  [15-20] 18  BP: ()/(52-82) 124/80  Gen: A+Ox3. No distress. HEENT: NCAT, neck supple, no carotid bruit. CV: RRR, S1S2, and intact distal pulses.  [place] [address]    Go in 2 days  to have your Keppra level checked    [facility]essica [facility]  [phone]    In 1 week  Post Discharge Followup    The above plan and follow-up instructions were reviewed with the patiparish

## 2018-05-11 ENCOUNTER — MED REC SCAN ONLY (OUTPATIENT)
Dept: GASTROENTEROLOGY | Facility: CLINIC | Age: 48
End: 2018-05-11

## 2019-08-15 ENCOUNTER — HOSPITAL ENCOUNTER (OUTPATIENT)
Dept: GENERAL RADIOLOGY | Age: 49
Discharge: HOME | End: 2019-08-15
Attending: EMERGENCY MEDICINE
Payer: MEDICAID

## 2019-08-15 ENCOUNTER — LAB ENCOUNTER (OUTPATIENT)
Dept: LAB | Age: 49
End: 2019-08-15
Attending: EMERGENCY MEDICINE
Payer: MEDICAID

## 2019-08-15 ENCOUNTER — APPOINTMENT (OUTPATIENT)
Dept: LAB | Age: 49
End: 2019-08-15
Attending: EMERGENCY MEDICINE
Payer: MEDICAID

## 2019-08-15 DIAGNOSIS — Z00.00 WELL ADULT EXAM: Primary | ICD-10-CM

## 2019-08-15 DIAGNOSIS — Z00.00 ROUTINE GENERAL MEDICAL EXAMINATION AT A HEALTH CARE FACILITY: ICD-10-CM

## 2019-08-15 DIAGNOSIS — R56.9 SEIZURE (HCC): ICD-10-CM

## 2019-08-15 LAB
ALBUMIN SERPL-MCNC: 4.3 G/DL (ref 3.4–5)
ALBUMIN/GLOB SERPL: 1.1 {RATIO} (ref 1–2)
ALP LIVER SERPL-CCNC: 149 U/L (ref 45–117)
ALT SERPL-CCNC: 40 U/L (ref 16–61)
ANION GAP SERPL CALC-SCNC: 6 MMOL/L (ref 0–18)
AST SERPL-CCNC: 20 U/L (ref 15–37)
BACTERIA UR QL AUTO: NEGATIVE /HPF
BASOPHILS # BLD AUTO: 0.01 X10(3) UL (ref 0–0.2)
BASOPHILS NFR BLD AUTO: 0.3 %
BILIRUB SERPL-MCNC: 0.5 MG/DL (ref 0.1–2)
BILIRUB UR QL: NEGATIVE
BUN BLD-MCNC: 10 MG/DL (ref 7–18)
BUN/CREAT SERPL: 8.8 (ref 10–20)
CALCIUM BLD-MCNC: 9.6 MG/DL (ref 8.5–10.1)
CHLORIDE SERPL-SCNC: 110 MMOL/L (ref 98–112)
CHOLEST SMN-MCNC: 137 MG/DL (ref ?–200)
CLARITY UR: CLEAR
CO2 SERPL-SCNC: 27 MMOL/L (ref 21–32)
COLOR UR: COLORLESS
COMPLEXED PSA SERPL-MCNC: 0.76 NG/ML (ref ?–4)
CREAT BLD-MCNC: 1.13 MG/DL (ref 0.7–1.3)
DEPRECATED RDW RBC AUTO: 52.9 FL (ref 35.1–46.3)
EOSINOPHIL # BLD AUTO: 0.15 X10(3) UL (ref 0–0.7)
EOSINOPHIL NFR BLD AUTO: 4.2 %
ERYTHROCYTE [DISTWIDTH] IN BLOOD BY AUTOMATED COUNT: 15.2 % (ref 11–15)
GLOBULIN PLAS-MCNC: 4 G/DL (ref 2.8–4.4)
GLUCOSE BLD-MCNC: 85 MG/DL (ref 70–99)
GLUCOSE UR-MCNC: NEGATIVE MG/DL
HCT VFR BLD AUTO: 38.8 % (ref 39–53)
HDLC SERPL-MCNC: 64 MG/DL (ref 40–59)
HGB BLD-MCNC: 13.2 G/DL (ref 13–17.5)
HGB UR QL STRIP.AUTO: NEGATIVE
IMM GRANULOCYTES # BLD AUTO: 0 X10(3) UL (ref 0–1)
IMM GRANULOCYTES NFR BLD: 0 %
KETONES UR-MCNC: NEGATIVE MG/DL
LDLC SERPL CALC-MCNC: 65 MG/DL (ref ?–100)
LEUKOCYTE ESTERASE UR QL STRIP.AUTO: NEGATIVE
LYMPHOCYTES # BLD AUTO: 1 X10(3) UL (ref 1–4)
LYMPHOCYTES NFR BLD AUTO: 27.9 %
M PROTEIN MFR SERPL ELPH: 8.3 G/DL (ref 6.4–8.2)
MCH RBC QN AUTO: 32.2 PG (ref 26–34)
MCHC RBC AUTO-ENTMCNC: 34 G/DL (ref 31–37)
MCV RBC AUTO: 94.6 FL (ref 80–100)
MONOCYTES # BLD AUTO: 0.38 X10(3) UL (ref 0.1–1)
MONOCYTES NFR BLD AUTO: 10.6 %
NEUTROPHILS # BLD AUTO: 2.05 X10 (3) UL (ref 1.5–7.7)
NEUTROPHILS # BLD AUTO: 2.05 X10(3) UL (ref 1.5–7.7)
NEUTROPHILS NFR BLD AUTO: 57 %
NITRITE UR QL STRIP.AUTO: NEGATIVE
NONHDLC SERPL-MCNC: 73 MG/DL (ref ?–130)
OSMOLALITY SERPL CALC.SUM OF ELEC: 294 MOSM/KG (ref 275–295)
PATIENT FASTING: YES
PATIENT FASTING: YES
PH UR: 8 [PH] (ref 5–8)
PLATELET # BLD AUTO: 79 10(3)UL (ref 150–450)
POTASSIUM SERPL-SCNC: 3.9 MMOL/L (ref 3.5–5.1)
PROT UR-MCNC: NEGATIVE MG/DL
RBC # BLD AUTO: 4.1 X10(6)UL (ref 4.3–5.7)
RBC #/AREA URNS AUTO: 0 /HPF
SODIUM SERPL-SCNC: 143 MMOL/L (ref 136–145)
SP GR UR STRIP: 1 (ref 1–1.03)
TRIGL SERPL-MCNC: 40 MG/DL (ref 30–149)
TSI SER-ACNC: 0.93 MIU/ML (ref 0.36–3.74)
UROBILINOGEN UR STRIP-ACNC: <2
VIT C UR-MCNC: NEGATIVE MG/DL
VLDLC SERPL CALC-MCNC: 8 MG/DL (ref 0–30)
WBC # BLD AUTO: 3.6 X10(3) UL (ref 4–11)
WBC #/AREA URNS AUTO: <1 /HPF

## 2019-08-15 PROCEDURE — 93005 ELECTROCARDIOGRAM TRACING: CPT

## 2019-08-15 PROCEDURE — 84443 ASSAY THYROID STIM HORMONE: CPT

## 2019-08-15 PROCEDURE — 71046 X-RAY EXAM CHEST 2 VIEWS: CPT | Performed by: EMERGENCY MEDICINE

## 2019-08-15 PROCEDURE — 93010 ELECTROCARDIOGRAM REPORT: CPT | Performed by: EMERGENCY MEDICINE

## 2019-08-15 PROCEDURE — 81001 URINALYSIS AUTO W/SCOPE: CPT

## 2019-08-15 PROCEDURE — 80061 LIPID PANEL: CPT

## 2019-08-15 PROCEDURE — 80053 COMPREHEN METABOLIC PANEL: CPT

## 2019-08-15 PROCEDURE — 36415 COLL VENOUS BLD VENIPUNCTURE: CPT

## 2019-08-15 PROCEDURE — 85025 COMPLETE CBC W/AUTO DIFF WBC: CPT

## 2020-11-13 ENCOUNTER — LAB ENCOUNTER (OUTPATIENT)
Dept: LAB | Age: 50
End: 2020-11-13
Attending: EMERGENCY MEDICINE
Payer: MEDICAID

## 2020-11-13 ENCOUNTER — APPOINTMENT (OUTPATIENT)
Dept: LAB | Age: 50
End: 2020-11-13
Attending: EMERGENCY MEDICINE
Payer: MEDICAID

## 2020-11-13 ENCOUNTER — HOSPITAL ENCOUNTER (OUTPATIENT)
Dept: GENERAL RADIOLOGY | Age: 50
Discharge: HOME | End: 2020-11-13
Attending: EMERGENCY MEDICINE
Payer: MEDICAID

## 2020-11-13 DIAGNOSIS — R56.9 SEIZURE (HCC): Primary | ICD-10-CM

## 2020-11-13 DIAGNOSIS — R44.0 HEARING VOICES: ICD-10-CM

## 2020-11-13 DIAGNOSIS — R56.9 SEIZURE (HCC): ICD-10-CM

## 2020-11-13 PROCEDURE — 36415 COLL VENOUS BLD VENIPUNCTURE: CPT

## 2020-11-13 PROCEDURE — 80185 ASSAY OF PHENYTOIN TOTAL: CPT

## 2020-11-13 PROCEDURE — 80053 COMPREHEN METABOLIC PANEL: CPT

## 2020-11-13 PROCEDURE — 93010 ELECTROCARDIOGRAM REPORT: CPT | Performed by: EMERGENCY MEDICINE

## 2020-11-13 PROCEDURE — 81003 URINALYSIS AUTO W/O SCOPE: CPT

## 2020-11-13 PROCEDURE — 84443 ASSAY THYROID STIM HORMONE: CPT

## 2020-11-13 PROCEDURE — 80186 ASSAY OF PHENYTOIN FREE: CPT

## 2020-11-13 PROCEDURE — 93005 ELECTROCARDIOGRAM TRACING: CPT

## 2020-11-13 PROCEDURE — 85025 COMPLETE CBC W/AUTO DIFF WBC: CPT

## 2020-11-13 PROCEDURE — 80061 LIPID PANEL: CPT

## 2020-11-13 PROCEDURE — 84439 ASSAY OF FREE THYROXINE: CPT

## 2020-11-13 PROCEDURE — 82306 VITAMIN D 25 HYDROXY: CPT
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
## 2021-01-11 NOTE — ED INITIAL ASSESSMENT (HPI)
Pt with hx liver disease. Wife called EMS due to patient having paranoid thoughts. Thinking people are buglerizing his home and his neighbors. Pt locked himself in this room.

## 2021-01-11 NOTE — ED PROVIDER NOTES
Patient Seen in: [facility] AND [facility] Emergency Department      History   Patient presents with:  Eval-P    Stated Complaint: psych    HPI/Subjective:   HPI    73-year-old male brought by EMS from home after reportedly patient was agitated and paranoid some tenderness. Neurological: Alert and oriented to person, place, and time. Clear speech, no focality, no dysarthria  Skin: Skin is warm and dry. Psychiatric: Normal mood and affect.   Behavior is normal.  Patient is not anxious and does not seem depresse Authorization. The authorized Fact Sheet for Healthcare Providers for this assay is available upon request from the laboratory. CBC WITH DIFFERENTIAL WITH PLATELET    Narrative:      The following orders were created for panel order CBC WITH DIFFERENTI Addendum added March 10, 2021 2300 for chart patient.   EKG dated January 12, 2020 1003 8 hours shows a sinus rate of 69 with evidence of right axis deviation without other acute ischemic changes with preserved intervals and no signifi

## 2021-01-12 NOTE — ED NOTES
Report given to faisal IBARRA of [facility], pt was accepted by Dr Мария Castro and will be going to room 356 bed 2

## 2021-01-12 NOTE — ED NOTES
Called security for pt's cell phone, pt home # [phone], pt reports wife's # [phone], updated crisis worker.

## 2021-01-12 NOTE — ED NOTES
Patient has been accepted to [facility] under Dr. Ayana Chand. Patient will be going to room 356, bed 2.     RN report can be given at [phone] and transportation can be arranged after report has been given.

## 2021-01-12 NOTE — ED NOTES
Per ED Tech, pt stating camera's are watching him. Pt A&Ox2, requiring more direction at this time, cooperative.
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
## 2021-01-12 NOTE — ED NOTES
Patient ambulated self to bathroom with steady gait and back into cart without incident. Will continue to monitor.

## 2021-01-12 NOTE — ED NOTES
Transfer Summary     MOE - Jason Saas - no answer  [facility] - left voicemail  [address] accepted the clinicals and is requesting and EKG be completed. Packet will be faxed when EKG results are available.

## 2021-01-12 NOTE — ED NOTES
Patient transferred to [facility] at this time per [facility] ambulance, belongings sent with patient, patient remains stable at time of transfer

## 2021-01-12 NOTE — ED NOTES
Lico the cell phone number listed for the patient's wife:  [phone]. The number rings and then disconnects. Called the home number listed: [phone]. The call goes into a message that says the voice mail is full.    Unable to leave a messag

## 2021-02-11 ENCOUNTER — APPOINTMENT (OUTPATIENT)
Dept: GENERAL RADIOLOGY | Facility: HOSPITAL | Age: 51
End: 2021-02-11
Attending: EMERGENCY MEDICINE
Payer: MEDICAID

## 2021-02-11 PROCEDURE — 73562 X-RAY EXAM OF KNEE 3: CPT | Performed by: EMERGENCY MEDICINE

## 2021-02-12 PROBLEM — F20.0 PARANOID SCHIZOPHRENIA, CHRONIC CONDITION WITH ACUTE EXACERBATION (HCC): Status: ACTIVE | Noted: 2021-02-12
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
## 2021-02-12 NOTE — ED NOTES
Patient transported to [facility] with [facility] and EMS staff. Vitals stable at time of ED departure. All patient belongings included in transport.

## 2021-02-12 NOTE — ED NOTES
Patient endorsed to me by [facility] RN for continuity of care. Patient is asleep and appears comfortable, breakfast tray at bedside.    1:1 Seclusion observation continued by Aguila Barrios ED PCT

## 2021-02-12 NOTE — ED NOTES
Report given to [address] RN  Patient accepted at [facility].   Dr. Harsha Martinez is accepting physician

## 2021-02-12 NOTE — ED PROVIDER NOTES
Patient endorsed to me by Dr. Ana Thomas for continuation of care - pt awaiting inpt psych transfer for delusions and has been calm throughout my shift. Pt endorsed to Dr. Denia Palacios for continuation of care.

## 2021-02-12 NOTE — ED NOTES
Spoke with pt wife Dayo Bush. Per Fatou Boggs, pt just got discharged from [facility] on 2/2/2021. Pt has been hospitalized twice recently for behavioral health. Per Fatou Boggs pt has been increasingly paranoid.  Fatou Boggs states that [facility]
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
## 2021-02-12 NOTE — CERTIFICATION
Ref: [facility] 5/3-403, 5/3-602, 5/3-607, 5/3-610    5/3-702, 5/3-813, 5/4-306, 5/4-402, 5/4-403    5/4-405, 5/4-501, 5/4-611, 1/0-815   Inpatient Certificate  Re: Britany Jorge    (name)     I personally informed the above-named individu his or her behavioral history, to suffer mental or emotional deterioration and is reasonably expected, after such deterioration, to meet the criteria of either paragraph one or paragraph two above;   []  An individual who is developmentally disabled and un

## 2021-02-12 NOTE — ED PROVIDER NOTES
Patient Seen in: [facility] AND [facility] Emergency Department      History   Patient presents with:  Eval-P    Stated Complaint: psych    HPI/Subjective:   HPI  Patient is a 58-year-old male history of alcohol abuse, cirrhosis, hyperammonemia on lactulose, sei drinks      Types: 6 Cans of beer per week    Drug use: Never             Review of Systems    Positive for stated complaint: psych  Other systems are as noted in HPI. Constitutional and vital signs reviewed.       All other systems reviewed and negative e baseline. Psychiatric:      Comments:  Thought content does not appear normal, appears paranoid and delusional.           ED Course     Labs Reviewed   URINALYSIS WITH CULTURE REFLEX - Abnormal; Notable for the following components:       Result Value PCR (GENEXPERT)       X-ray left knee    IMPRESSION:    No acute fracture or malalignment. 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 Nonspecific calcification in the soft tissues along the lateral aspect of the distal femur is noted, nonspecific.   No signi
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
## 2021-02-13 NOTE — CONSULTS
[facility]    PATIENT'S NAME: Kristopher Reese CHRISTINA   ATTENDING PHYSICIAN: Dion Morales MD   CONSULTING PHYSICIAN: Katey Oconnell MD   PATIENT ACCOUNT#:   [id number]    LOCATION:  [place] [address]  MEDICAL RECORD #:   [id number] ideation. The patient apparently has been going into multiple psychiatric admissions and the patient has not been compliant with treatment and management. The patient was in a psychiatric hospital in January and in December.   The patient today denied marnie syndrome. Rule out Wernicke's syndrome for patient having heavy alcohol abuse.       PLAN:  Discussed risks and benefits, acknowledging that the patient has a serious condition of medical and psychological impairment causing him to have excessive delusiona
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
## 2021-02-25 NOTE — ED NOTES
Called back [facility]. Spoke to Tina Every. She reports that they never received the packet. Re-faxed along with success fax sheet from prior faxing.

## 2021-02-25 NOTE — ED NOTES
Assumed pt care at this time, pt updated on plan of care, pt denies discomforts. Pt in view of RN, call light within reach.

## 2021-02-25 NOTE — BH LEVEL OF CARE ASSESSMENT
Crisis Evaluation Assessment    Olegario Dennis YOB: 1970   Age 44year old MRN [id number]   Location [address] Attending Mahin Whyte MD      Patient's legal sex: male  Patient identifies as: ma Firearm/Weapon: No  Discussion of Removal of Firearm/Weapon: N/A  Do you have a firearm owner ID card?: No  Collateral for any access to means/firearms/weapons: No collateral present    Protective Factors:   Protective Factors: Unknown at this time    [facility] hosptial bed)  Abnormal movements: None  Posture: Slouched;Relaxed; Other (comment)(Pt in hospital bed)  Rate of Movement: Normal  Mood and Affect  Mood or Feelings: Other (comment)(Euthymic)  Appropriateness of Affect: Congruent to mood; Appropriate to situ Schizophrenia      Secondary Psychiatric Diagnoses  Deferred at this time      Pervasive Diagnoses  None      Pertinent Non-Psychiatric Diagnoses  Cirrhosis   Hyperammonemia on Lactulose   Seizure Disorder  Hypertension         Seth Nguyễn, [facility]

## 2021-02-25 NOTE — ED PROVIDER NOTES
Patient Seen in: [facility] AND [facility] Emergency Department      History   Patient presents with:  Eval-P    Stated Complaint: hallucinations     HPI/Subjective:   HPI    73-year-old male with history of hypertension, psoriasis, lactulose, seizures, TBI, her signs reviewed. All other systems reviewed and negative except as noted above.     Physical Exam     ED Triage Vitals [02/24/21 2113]   BP (!) 139/94   Pulse 88   Resp 18   Temp 98.1 °F (36.7 °C)   Temp src Oral   SpO2 99 %   O2 Device None (Room air) Sodium 141 136 - 145 mmol/L    Potassium 3.4 (L) 3.5 - 5.1 mmol/L    Chloride 110 98 - 112 mmol/L    CO2 29.0 21.0 - 32.0 mmol/L    Anion Gap 2 0 - 18 mmol/L    BUN 9 7 - 18 mg/dL    Creatinine 1.13 0.70 - 1.30 mg/dL    BUN/CREA Ratio 8.0 (L) 10.0 - 20.0 Neutrophil Absolute Prelim 4.78 1.50 - 7.70 x10 (3) uL    Neutrophil Absolute 4.78 1.50 - 7.70 x10(3) uL    Lymphocyte Absolute 1.55 1.00 - 4.00 x10(3) uL    Monocyte Absolute 0.66 0.10 - 1.00 x10(3) uL    Eosinophil Absolute 0.13 0.00 - 0.70 x10(3) uL history, performing the physical exam and reviewing the diagnostics, multiple initial diagnoses were considered based on the presenting problem including psychosis, hepatic encephalopathy, delusions.                      Disposition and Plan     Clinical Im

## 2021-02-25 NOTE — ED NOTES
Spoke to Jesse, the wife of J Carlos Hill. She reports that J Carlos Hill has been hospitalized psychiatrically 3 times:  12/20 at [facility] in [place], [place] 1/21 and [facility] 2/21. J Carlos Hill was discharged from [facility] on 2/19/21.   He is currently prescribed [place] Basim Balderas stated that [place] to people telepathically. He states he is having conversations with people in [facility] and can tell what people are saying. I will go to talk to him and he tells me to shut up that he is having a conversation with someone\".

## 2021-02-25 NOTE — ED NOTES
Met with Olegario. Reviewed Rights of Recipients Receiving [facility] [place] [facility], utilizing the language line. Diana,  # [id number] provided the translation. [facility] was willing to sign the rights sheet.   He disagrees with the recommendation for inpatient hosp

## 2021-02-25 NOTE — ED NOTES
Call back from Manjula Conti with [facility] in [facility]. Phone number provided for faxing is actually her call back number. Re-faxed to corrected fax number.

## 2021-02-25 NOTE — ED NOTES
Attempted to call pt's wife again. Called [phone] and it states the person's mailbox is full and then hangs up.  Called [phone] and again it rang twice then went to busy signal.

## 2021-02-25 NOTE — ED NOTES
LATE ENTRY:  While utilizing Diana with the language line earlier, writer inquired whether Pamela Owen was okay with is wife being advised where he is admitted and he stated that would be fine.

## 2021-02-25 NOTE — ED PROVIDER NOTES
Patient signed out to me from previous medical team.    Patient is a 49-year-old male here for hallucinations and inappropriate behavior.      10:22 AM Medically cleared awaiting crisis eval.     1:29 PM  Patient evaluated by crisis and petition Alia Phi

## 2021-02-25 NOTE — ED NOTES
Called patient's pharmacy to review patient's medications. Medications updated.     [address]

## 2021-02-25 NOTE — CM/SW NOTE
Called and spoke with patient's wife Barber Elliott on her cell#[phone]. Informed her patient ready for discharge - Barber Elliott requested to speak with Dr. Katt Demarco.  Obtained correct ph#'s from wife goldie herself and for patient and had Kurt Rogers in registration update p

## 2021-02-25 NOTE — ED NOTES
Call from [facility] with [facility] in [facility]. She advised that she has an accepting MD.  However, the packet still has to be reviewed by their nurse.

## 2021-02-25 NOTE — ED NOTES
Emailed P [employer] C, Rights of Recipients Receiving [address] and  note to [facility] due to failed fax receipt.

## 2021-02-25 NOTE — ED NOTES
Ham Morales in [facility]. Spoke to [facility]. Waiting for medical clearance note and packet will be faxed.

## 2021-02-25 NOTE — ED NOTES
Pt asking for his cloths to leave, Pt informed we need to speak to his wife in order to continue with our care plan. Will attempt to reach the wife again. Pt redirected into the room without issue. Pt sitting on the edge of his cart, appears calm.   Radha Ellison

## 2021-02-25 NOTE — ED NOTES
Called back [facility]. Spoke to William. She confirmed they received the fax. She advised that there are a couple referrals ahead of Olegario to be reviewed.

## 2021-02-25 NOTE — ED NOTES
Attempted to call pt's wife again. Called [phone] and it states the person's mailbox is full and then hangs up.  Called [phone] and again it rang twice then went to busy signal.

## 2021-02-25 NOTE — ED NOTES
Scanned P & C,  Rights, and failed fax receipt to [facility] AND [facility] into [facility]

## 2021-02-25 NOTE — ED NOTES
Faxed Referral packet to [facility] in [facility] for review.

## 2021-02-25 NOTE — ED NOTES
Pt now awake. Stating that his back is in pain. MD notified in order to have pain medication ordered. Pt is redirectable back to the bed, calm and cooperative.

## 2021-02-25 NOTE — ED NOTES
Faxed P & C and Rights of Individuals receiving MH Services to [facility] AND [facility].

## 2021-02-25 NOTE — ED NOTES
3:00 PM  RN left vm for patient on home number requesting call back to complete Mohs pre-op assessment. RN will follow up. Tran at bedside speaking with pt about inpatient plan.

## 2021-02-25 NOTE — ED INITIAL ASSESSMENT (HPI)
Call back from [facility] with [facility] in [facility]. They are unable to accomodate on their unit.

## 2021-02-25 NOTE — ED NOTES
This tech taking over seclusion at this time. Pt currently sleeping on the cart. Will offer additional comfort measures when the pt wakes up.

## 2021-02-25 NOTE — ED NOTES
Called back [facility] to [place]. She advised that they are not currently accepting any new admissions due to plumbing issues that are being worked on and first need to be resolved.

## 2021-02-25 NOTE — ED NOTES
Attempted to call pt's wife for collateral. Her number rings twice and then goes to a busy signal. Called 2nd time and same thing happened. Attempted to call the house number. States the mailbox is currently full and cannot accept new messages.

## 2021-02-26 NOTE — ED NOTES
Updated P&C sent to [address] [place] [address]. They are just waiting to hear from [facility] in [place] when we can send the pt. Will call back.

## 2021-02-26 NOTE — CONSULTS
[facility]    Report of Consultation    Charlene Lei Patient Status:  Emergency    3/23/1970 MRN [id number]   Location [address] Attending Armani Mcdonough MD   [address] Day # 0 PCP Alina Rosales oriented to the date, place and condition demonstrating lack of dementia diagnosis but is mostly increased paranoia and bizarre behavior.     Since last admission we indicated in our report that the patient would benefit from injectable medication for the p Smoker        Packs/day: 0.50        Types: Cigarettes      Smokeless tobacco: Never Used    Alcohol use: Not Currently      Alcohol/week: 6.0 standard drinks      Types: 6 Cans of beer per week    Drug use: Never          Current Medications:    •  ARIpip denying any suicidal or homicidal ideation. The patient denying any auditory or visual hallucination. The patient is treating persecutory delusional ideation accusing neighbor and demonstrating bizarre and aggressive behavior with them.   Cognition is ion (GeneXpert) Once      Meds This Visit:  Requested Prescriptions      No prescriptions requested or ordered in this encounter           Khoa Sagastume MD  2/25/2021

## 2021-02-26 NOTE — ED NOTES
Spoke with Geni Burns at [employer] patient accept at [facility] in [place]  room 180-8

## 2021-02-26 NOTE — ED NOTES
Received call from [facility] at [facility], would like [facility] for today. Made liason aware. Will be expecting call for RN to RN report.

## 2021-02-26 NOTE — ED NOTES
[facility] at bedside, original [facility] given to transport. Security called for pt belongings.

## 2021-02-26 NOTE — CERTIFICATION
Ref: [facility] 5/3-403, 5/3-602, 5/3-607, 5/3-610    5/3-702, 5/3-813, 5/4-306, 5/4-402, 5/4-403    5/4-405, 5/4-501, 5/4-611, 4/9-701   Inpatient Certificate  Re: Eleazar Martinez    (name)     I personally informed the above-named individu his or her behavioral history, to suffer mental or emotional deterioration and is reasonably expected, after such deterioration, to meet the criteria of either paragraph one or paragraph two above;   []  An individual who is developmentally disabled and un

## 2021-03-03 NOTE — ED NOTES
Received a phone call from [facility] [employer] with [facility]. Completed Change of Status Form. Emailed it to [facility] and scanned it in [place].

## 2021-04-07 NOTE — BH LEVEL OF CARE ASSESSMENT
Crisis Evaluation Assessment    Olegario Bedollatomaden Mora YOB: 1970   Age 46year old MRN [id number]   Location [facility] Attending Ching Goyal MD      Patient's legal sex: male  Patient identifies as: your experience of thoughts of dying by suicide:  (n/a)  Protective Factors: n/a  Past Suicidal Ideation: Denies    Family History or Personal Lived Experience of Loss or Near Loss by Suicide: Denies     Non-Suicidal Self-Injury:   none    Access to Means: Clear  Cognition  Concentration: Impaired  Memory: Recent memory intact; Remote memory intact  Orientation Level: Oriented to person;Oriented to place; Disoriented to time;Disoriented to situation  Insight: Poor  Fair/poor insight as evidenced by: evidenced

## 2021-04-07 NOTE — ED PROVIDER NOTES
Patient Seen in: [facility] AND [facility] Emergency Department      History   Patient presents with:  Eval-P    Stated Complaint: psych    HPI/Subjective:   HPI    13-year-old male with history of alcohol abuse, cirrhosis, hyperammonemia on lactulose, hypertens negative except as noted above.     Physical Exam     ED Triage Vitals [04/07/21 0903]   /88   Pulse 79   Resp 20   Temp 97.8 °F (36.6 °C)   Temp src Oral   SpO2 97 %   O2 Device None (Room air)       Current:/78   Pulse 76   Temp 97.8 °F (36.6 healthcare provider. Signs and symptoms of respiratory viral infection due to SARS-CoV-2, influenza, and RSV can be similar. Test performed using the Xpert Xpress SARS-CoV-2/FLU/RSV assay on the [address] [place] [address], [address] [place] [address].    This
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
## 2021-04-08 NOTE — ED QUICK NOTES
RN to RN report given to Wilber Hernández from [facility]. Accepting MD Olivia Hernández.  They are requesting transport at 7pm.

## 2021-04-08 NOTE — ED NOTES
The following facilities were contacted for in-patient:    [address] [place] [address] to follow-up with referral status. [facility] was unable to accommodate due to medical acuity. [facility]- No beds available.     [facility]-  There is not an Intensive Treatment Bed

## 2021-04-08 NOTE — ED NOTES
Met with Olegario, utilizing the language line. Initially, Jennifer Mora (( # [id number]) was providing the translation. However,  The connection was lost, so had to call back and start the process over.    Marcia Mane # [id number] provided the translation

## 2021-04-08 NOTE — ED PROVIDER NOTES
Patient signed out to me from previous medical team.      Patient is a 49-year-old male with multiple medical problems presenting with paranoia and hallucinations. Petition certification filled out. Pending inpatient transfer.     EKG    Rate, intervals a

## 2021-04-08 NOTE — ED NOTES
Olegario's wife called to inquire about disposition/  Verneita Commander, [facility]  # [phone], asked Christludivina Brothers for permission to inform his wife that he was transferring to [facility] gave verbal permission to update his wife regarding the transfer.   Call

## 2021-04-08 NOTE — ED NOTES
[place] a call from [facility] stating that they are unable to accommodate as there will be no bed availability.

## 2021-04-08 NOTE — ED QUICK NOTES
Pt informed, through , of impending transfer to [facility]. Pt gave permission for wife to be informed. Pt also asked if someone could make sure someone could take care of the place he was staying. Pt was ok with having his wife do this.

## 2021-04-08 NOTE — ED NOTES
Spoke to [facility] in [facility] Intake at [facility].  Referal packet faxed. Hardy David RN on request for an EKG. Will fax the EKG when it is available.

## 2021-04-08 NOTE — ED NOTES
[facility] AND [facility] HEALTH [place]. Left a message requesting a call back about a bed request.

## 2021-04-08 NOTE — ED NOTES
Received a call from [facility] asking to speak with Olegario's Nurse. Provided phone number so that Nurse to Nurse can be completed.

## 2021-04-08 NOTE — ED QUICK NOTES
Pt received and ate dinner tray. Pt now laying in bed with blankets. Pt is calm and cooperative at this time.

## 2021-04-08 NOTE — ED NOTES
The following facilities were contacted for in-patient placement:    [facility]-Unable to accept referrals from outside [facility] at this time. [facility]-Referral made over the phone. Referral Packet faxed for review.     [facility]

## 2021-04-08 NOTE — ED NOTES
Spoke with Dimas Paget, Jose's Nurse, to confirm that Nurse to Nurse was completed with [address] [facility] informed Discharge  that Nurse to Nurse was completed. Jolane Friday was accepted to [facility] under Dr. Mayte Carlton.  Asad stated that transportatio

## 2021-04-09 NOTE — CONSULTS
[facility]    Report of Consultation    Roman Lei Patient Status:  Emergency    3/23/1970 MRN [id number]   Location [address] Attending Ronald Ashford MD   [facility] Day # 0 PCP Guerline Vazquez for inpatient mission for safety and stabilization.     Patient has been in our emergency room multiple times and I have been documenting that patient will not be compliant on medication and he need injectable antipsychotic medication for that I have starte wife, patient did not             tolerate and was told by a provider to stop             taking.  Unclear if true allergy or not      Review of Systems:     As by Admitting/Attending    Results:     Laboratory Data:  Lab Results   Component Value Date    W aggression and refusing medication.       Impression:     Impression:     Paranoid schizophrenia, chronic condition with acute exacerbation.     The patient with a chronic history of paranoid schizophrenia who has not been compliant to the medication and ha

## 2021-04-09 NOTE — CERTIFICATION
Ref: [facility] 5/3-403, 5/3-602, 5/3-607, 5/3-610    5/3-702, 5/3-813, 5/4-306, 5/4-402, 5/4-403    5/4-405, 5/4-501, 5/4-611, 2/4-820   Inpatient Certificate  Re: Modesto Gutiérrez    (name)     I personally informed the above-named individu his or her behavioral history, to suffer mental or emotional deterioration and is reasonably expected, after such deterioration, to meet the criteria of either paragraph one or paragraph two above;   []  An individual who is developmentally disabled and un

## 2021-04-18 NOTE — ED NOTES
Petition, Certificate, and Rights of Individuals were emailed to [facility] AND [facility]. The same documents were scanned into Epic.

## 2021-04-18 NOTE — ED PROVIDER NOTES
Patient Seen in: [facility] AND [facility] Emergency Department    History   Patient presents with:  Eval-P      HPI    49-year-old male presents the ER for psychiatric evaluation. Patient with a past medical history of traumatic brain injury.   Patient has been b positives to the presenting problem noted.     Physical Exam     ED Triage Vitals [04/18/21 0102]   /79   Pulse 66   Resp 18   Temp 98.2 °F (36.8 °C)   Temp src Temporal   SpO2 96 %   O2 Device None (Room air)       All measures to prevent infection t Value    Potassium 3.1 (*)     All other components within normal limits   HEPATIC FUNCTION PANEL (7) - Abnormal; Notable for the following components:    ALT 77 (*)     All other components within normal limits   URINALYSIS WITH CULTURE REFLEX - Abnormal; -----------         ------                     CBC W/ DIFFERENTIAL[[id number]]          Abnormal            Final result                 Please view results for these tests on the individual orders.    DRUG ABUSE PANEL 10 SCREEN   RAINBOW DRAW BLUE   RAINBOW follow-up provider specified.     Medications Prescribed:  Discharge Medication List as of 4/18/2021 10:56 PM

## 2021-04-18 NOTE — CONSULTS
[facility]      Report of Psychiatric Consultation    Mirella Lei Patient Status:  Emergency    3/23/1970 MRN [id number]   Location [address] Attending Sheila Edwards, [address] [facility] Day # 0 P Surgical History:   Procedure Laterality Date   • EYE SURGERY       No family history on file. reports that he has been smoking cigarettes. He has been smoking about 0.50 packs per day.  He has never used smokeless tobacco. He reports previous alcohol use GLU 86 04/18/2021    CA 9.0 04/18/2021    ALB 4.0 04/18/2021    ALKPHO 111 04/18/2021    BILT 0.5 04/18/2021    TP 7.6 04/18/2021    AST 21 04/18/2021    ALT 77 04/18/2021    ETOH <3 04/18/2021       IMAGING:  No results found.      IMPRESSION:  Schizophren

## 2021-04-18 NOTE — ED QUICK NOTES
Spoke with Manjula Ko from [facility] who reports patient has been accepted pending receipt of clinical packet with medical clearance. Awaiting call back from [facility] for nurse to nurse report once packet is received. Accepting MD is Dr. Kory Daigle.

## 2021-04-18 NOTE — ED QUICK NOTES
Patient asking the ER-tech at bedside repeatedly for more medications. Attempted to use the language line to ensure adequate understanding but patient refused and became angry at .

## 2021-04-18 NOTE — ED NOTES
Called back Jesse, the patient's wife. Updated her regarding Margarito's inquiry about home health and that they are unable to come daily to administer medication.   Current medications prescribed per Jesse are Olanzapine 20 m.g. at bed and Trazedone 100

## 2021-04-18 NOTE — ED NOTES
Spoke to Dr Lakeisha Olson. He completed the psych consult via teleconference and supports inpatient hospitalization. Dr Lakeisha Olson advised that he will start himon Abilify and a prn antianxiety medication.   Dr Lakeisha Olson also indicated the need for injectable medicatio

## 2021-04-18 NOTE — ED QUICK NOTES
Patient medicated with abilify and ativan per order. Patient became irritated with this RN, raising voice and asking for more medications--but finally agreeable to take them after redirection.

## 2021-04-18 NOTE — CERTIFICATION
Ref: [facility] 5/3-403, 5/3-602, 5/3-607, 5/3-610    5/3-702, 5/3-813, 5/4-306, 5/4-402, 5/4-403    5/4-405, 5/4-501, 5/4-611, 6/5-012   Inpatient Certificate  Re: Rocio Grider    (name)     I personally informed the above-named individu expected based on his or her behavioral history, to suffer mental or emotional deterioration and is reasonably expected, after such deterioration, to meet the criteria of either paragraph one or paragraph two above;   []  An individual who is developmental

## 2021-04-18 NOTE — ED QUICK NOTES
Report received from [facility]. Patient currently resting on cart with ER-tech at bedside for continued seclusion.
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
## 2021-04-18 NOTE — ED NOTES
Spoke to Jesse, patient's wife again. Updated her that inpatient is recommended and she supports the plan. Also, recommended that she discuss injectable medication at [address] [place] [address] next future appointment with [facility].

## 2021-04-18 NOTE — ED QUICK NOTES
Spoke with Bladimir Murillo from [facility] is requesting copy of CT brain due to history of TBI and Potassium replacement. CT brain results from last exam (12/2020) faxed to [phone] for Bladimir Murillo at [facility]. 40meq of potassium given per order.  Rigo Crouch

## 2021-04-18 NOTE — ED PROVIDER NOTES
1325: Patient endorsed to me pending psychiatric assessment by Dr. Renetta Olson. Dr. Renetta Olson feels patient would benefit from psychiatric transfer and admission. Inpatient certificate placed in chart. Patient remains stable. Medically cleared in ED.

## 2021-04-18 NOTE — ED NOTES
[facility]  Per [facility]. Oswald also requested a CT scan and potassium replacement.

## 2021-04-18 NOTE — ED NOTES
Met with Olegario. Utilized the language line to review Rights of Individuals and Petition. Evie Abdi,  # [id number] provided the translation. Huma Cortez does not agree to sign in as a voluntary patient.   Confirmed again with Huma Cortez that it is ok to let his w

## 2021-04-18 NOTE — ED NOTES
Transfer Summary:     Deflected:   [facility] (d/t no acute beds, call in the AM if pt still needs placement)   [address]  [address]  [place]- no beds, call back around [address]. [facility]- no beds, call in the AM   [place]- no beds, call back i

## 2021-04-19 NOTE — ED QUICK NOTES
Nurse to nurse report given to [address] Alonso RN of [facility], accepting provider is [facility] eta 60 to 90 minutes

## 2021-05-24 NOTE — ED QUICK NOTES
Patient endorsed to me per primary RN, Stephanie Mckeon. Patient laying on gurney, appears relaxed, calm and content. Albanian speaking.  services used. Informed patient plan is for MD to assess. Patient denies thoughts of self harm or others.  Seclusion in pr Implemented All Universal Safety Interventions:  [place] to call system. Call bell, personal items and telephone within reach. Instruct patient to call for assistance. Room bathroom lighting operational. Non-slip footwear when patient is off stretcher. Physically safe environment: no spills, clutter or unnecessary equipment. Stretcher in lowest position, wheels locked, appropriate side rails in place.

## 2021-05-24 NOTE — ED NOTES
Attempted again to reach pt's wife/collateral, Vicki Allen. No answer, straight to . M requesting call back asap to provide further information and update on pt disposition.

## 2021-05-24 NOTE — ED INITIAL ASSESSMENT (HPI)
EMS states  That the Pt was outside at home screaming at other people.  States he has a hx of mental health problems

## 2021-05-24 NOTE — ED NOTES
Transfer out progress during morning shift 5/24/21:  [facility]:   @ 4:58 pm-This writer spoke with [facility] regarding referral.  Patient's packet is still under review. @ 4:24 pm-Received an incoming phone call from [address] [place] [address].  Refaxed P + C.  @ 3:05 pm-This pm-This writer contacted the intake/referral line. This writer spoke with Primo Albrecht there are no beds. Gael Guillory:  @ 1:26 pm-This writer contacted referral transfer line.   Informed that no beds are available at this time.

## 2021-05-24 NOTE — ED PROVIDER NOTES
Patient Seen in: [facility] AND [facility] Emergency Department    History   Patient presents with:  Mental Health Problem      HPI    24-year-old male presents the ER for psychiatric evaluation. Patient with history of traumatic brain injury.   Patient was outsid Temp 98.2 °F (36.8 °C)   Temp src    SpO2    O2 Device        All measures to prevent infection transmission during my interaction with the patient were taken. The patient was already wearing a droplet mask on my arrival to the room.  Personal protective Value    Acetaminophen <2.0 (*)     All other components within normal limits   BASIC METABOLIC PANEL (8) - Abnormal; Notable for the following components:    Glucose 108 (*)     Potassium 3.1 (*)     Chloride 113 (*)     BUN/CREA Ratio 8.7 (*)     Calcium records for any recent pertinent discharge summaries, testing, and procedures and reviewed those reports. Complicating Factors: The patient already has does not have any pertinent problems on file. to contribute to the complexity of this ED evaluation.

## 2021-05-24 NOTE — CONSULTS
[facility]    Report of Consultation    Mame Lei Patient Status:  Emergency    3/23/1970 MRN [id number]   Location [address] Attending [address] Day # 0 TOMAS Morales delusional ideation, fixating on his neighbor, and able to sleep in the last few night, poor compliant on medication, increase physical and verbal agitation and unable to control by his family reason with his delusional fixation.   Patient has been demonstr Laterality Date   • EYE SURGERY         Family History  No family history on file.     Social History  Social History    Tobacco Use      Smoking status: Current Every Day Smoker        Packs/day: 0.50        Types: Cigarettes      Smokeless tobacco: Never age male in hospital gown sitting in his bed. The patient did not demonstrate any psychomotor agitation or retardation. The patient spoke in a clear language with regular rate and rhythm speech.   Patient was able to express himself well in middle of some Dilantin 200 mg twice daily.   5.  Coordinate treatment plan to the team.        Orders This Visit:  Orders Placed This Encounter      CBC With Differential With Platelet      Salicylate, Serum      Acetaminophen (Tylenol), S      Ethyl Alcohol      Basic M

## 2021-05-24 NOTE — BH LEVEL OF CARE ASSESSMENT
Crisis Evaluation Assessment    Olegario Graff YOB: 1970   Age 46year old MRN [id number]   Location [address] Attending Charlee Tirado DO      Patient's legal sex: male  Patient identifies as Patient  In what setting is the screener performed?: in person  1. Have you wished you were dead or wished you could go to sleep and not wake up? (past 30 days): No  2. Have you actually had any thoughts of killing yourself? (past 30 days):  No of Removal of Firearm/Weapon: Patient denies access  Do you have a firearm owner ID card?: No  Collateral for any access to means/firearms/weapons: Unable to reach collateral    Protective Factors:   Protective Factors: spouse    Review of Psychiatric Syst It is unknown if the patient has any current children. Patient denies legal history. Patient is living with his wife who he is fully supported by.             Mele and Complex (as applicable):                                    EDP Assessment (as applicab ideation;Delusions;Persecutory beliefs  Level of Consciousness: Alert  Level of Consciousness: Alert  Behavior  Exhibited behavior: Participated      Disposition:    Assessment Summary:   Patient is a 28-year-old Upper sorbian-speaking male who presents to the  the inability to create a safe discharge plan, both are in agreement that inpatient hospitalization is required for safety and stability.              Risk/Protective Factors  Protective Factors: spouse    Level of Care Recommendations  Consulted with:

## 2021-05-25 NOTE — ED NOTES
Namrata Ovalle has been accepted for admission to [facility] under the care of Dr Indy Khan. Okay to send now.

## 2021-05-25 NOTE — ED QUICK NOTES
Care endorsed to [facility] for transfer to [facility]. Pt's belongings given to [facility] per Time Delgado.

## 2021-05-25 NOTE — ED NOTES
Called back [facility].  Confirmed that sharda received the UDS/BAL fax and she has received it. Donell Geiger added that case will be endorsed tot [facility] nurse for review.

## 2021-05-25 NOTE — CERTIFICATION
Ref: [facility] 5/3-403, 5/3-602, 5/3-607, 5/3-610    5/3-702, 5/3-813, 5/4-306, 5/4-402, 5/4-403    5/4-405, 5/4-501, 5/4-611, 4/4-835   Inpatient Certificate  Re: Brittany Mathew    (name)     I personally informed the above-named individu on his or her behavioral history, to suffer mental or emotional deterioration and is reasonably expected, after such deterioration, to meet the criteria of either paragraph one or paragraph two above;   []  An individual who is developmentally disabled and

## 2021-05-25 NOTE — ED QUICK NOTES
Report received from [facility]. Pt is awake and alert, finished eating his dinner tray, sitter remains at bedside. Will continue to monitor closely.

## 2021-05-25 NOTE — ED QUICK NOTES
Report received from [facility]. Pt sleeping. Bed is in low & locked position. Call light in reach. ER tech outside room for seclusion. Pt aware of plan of care. Will continue to monitor.

## 2021-05-25 NOTE — ED NOTES
Call from [facility] with [facility]  @ [phone]. [facility] inquired if we were still looking for a bed,. She also did not received the UDS/BAL result. Re-faxed UDS/BAL results.

## 2021-05-25 NOTE — ED NOTES
Updated Transfer Summary     Previously contacted on PM shift:    [facility] - deflected  [facility] - deflected  [facility] - no beds  [facility] - denies receiving a packet and said they are unable to accept one now because there are many packet ahead of the patient to be

## 2021-05-25 NOTE — ED QUICK NOTES
Report given to Ethan Bowens RN at [facility]. Pt accepted under Dr. Adithya Farah at [facility].

## 2021-05-25 NOTE — ED NOTES
Call from [facility] with [facility], [phone]. She was requesting to speak to the nurse working with [facility]. Transferred caller to POD 5.

## 2021-05-25 NOTE — ED QUICK NOTES
Medication list completed with pt and verified with pharmacist at [facility] in [facility]. Pt moved to inpatient bed.

## 2021-12-15 NOTE — ED NOTES
Patient is resting comfortably, watching TV.
Patient is resting comfortably; he remains awake, conversant, not in distress, watching TV. No seizure episode while in ER so far.
Pt back in room after being discharge, states that he feels like he is about to have another seizure. Noted pt to be awake, conversant and able to sustain eye contact, with no signs of distress. Encouraged pt to rest; fall precautions maintained.
Pt with no complains of nausea, states he feels fine.
Pt's  also states that pt has been having soft to watery stools for years with no fever.
Seizure pads in place.
.

## 2023-07-19 NOTE — ED AVS SNAPSHOT
FYI   [facility] Emergency [facility]    Miguel Angel [address]     [address] [place] [address]    Phone:  [phone]    Fax:  [phone] [place]   MRN: [id number]    Department:  [facility] Emergency Department   Date of Visit:  4/18/201 and Class Registration line at [phone] or find a doctor online by visiting [email or web address].    IF THERE IS ANY CHANGE OR WORSENING OF YOUR CONDITION, CALL YOUR PRIMARY CARE PHYSICIAN AT ONCE OR RETURN IMMEDIATELY TO [address] [place] [address].     If

## 2023-07-25 NOTE — ED QUICK NOTES
Care endorsed to Merly Brock RN. Spoke to wife who states they received my message last week to hole venetoclax but though it was an old message, confirmed that they have not been holding venetoclax as message instructed. Per Marion's verbal orders I instructed patient to get labs done today so that we can see where levels are at. Verbalized understanding and states patient is on his way now to have labs done. Wife also requested that if not able to contact them to send portal message.

## 2025-02-21 NOTE — ED INITIAL ASSESSMENT (HPI)
Patient presents via EMS for hallucinations. Per medics patient believes \"the police want to kill me\". Cooperative. Romanian speaking. <--- Click to Launch ICDx for PreOp, PostOp and Procedure

## (undated) NOTE — LETTER
[facility]  Administration of Anesthesia  1. I, Sheron Lawrence, or _________________________________ acting on his behalf, (Patient) (Dependent/Representative) request to receive anesthesia for my pending procedure/operation/treatment.   A phy infections, high spinal block, spinal bleeding, seizure, cardiac arrest and death. 7. AWARENESS: I understand that it is possible (but unlikely) to have explicit memory of events from the operating room while under general anesthesia.   8. ELECTROCONVULSIV unconscious pt /Relationship    My signature below affirms that prior to the time of the procedure, I have explained to the patient and/or his/her guardian, the risks and benefits of undergoing anesthesia, as well as any reasonable alternatives.     _______

## (undated) NOTE — ED AVS SNAPSHOT
[facility] Emergency Department    Miguel Angel [address]     [address] [place] [address]    Phone:  [phone]    Fax:  [phone] [address]   MRN: [id number]    Department:  [facility] Emergency Department   Date of Visit:  4/18/201 coverage and benefits available for follow-up care and referrals. If you have difficulty scheduling your follow-up appointment as directed, please call our  at [phone].      Si tiene problemas para programar lorri charan de seguimiento s different from what your Primary Care doctor has instructed you - please continue to take your medications as instructed by your Primary Care doctor until you can check with your doctor. Please bring the medication list to your next doctor's appointment. can help with your Affordable Care Act coverage, as well as all types of Medicaid plans. To get signed up and covered, please call [phone] and ask to get set up for an insurance coverage that is in-network with [facility]

## (undated) NOTE — ED AVS SNAPSHOT
Gigi Bravo   MRN: [id number]    Department:  [facility] Emergency Department   Date of Visit:  7/27/2017           Disclosure     Insurance plans vary and the physician(s) referred by the ER may not be covered by your plan.  Please contact your CARE PHYSICIAN AT ONCE OR RETURN IMMEDIATELY TO THE EMERGENCY DEPARTMENT. If you have been prescribed any medication(s), please fill your prescription right away and begin taking the medication(s) as directed.   If you believe that any of the medications

## (undated) NOTE — LETTER
5/12/2018              [address] [place] [address]         Dear Mr. Laquita Rodriguezort,    As we discussed, your stomach endoscopy exam (\"EGD\") at [facility] showed mild gastritis of your stomach and healthy/tiff

## (undated) NOTE — IP AVS SNAPSHOT
[address]  [address] [facility] [phone]                Discharge Summary   6/7/2017    Dia Balderrama           Admission Information        Provider Department    6/7/2017 Uriel Alaniz MD [facility] 5sw/Se Instructions Authorizing Provider    Morning Afternoon Evening As Needed    levETIRAcetam 500 MG Tabs   Last time this was given:  500 mg on 6/9/2017  9:46 AM   Commonly known as:  KEPPRA   Next dose due:  Please start tomorrow at 9AM        Take 500 mg b Abs Final Neut Abs Lymphocyte Abso Monocyte Absolu Eosinophil Abso Basophil Absolu    (06/08/17)  65 (06/08/17)  21 (06/08/17)  12 (06/08/17)  2 (06/08/17)  1 -- (06/08/17)  2.7 (06/08/17)  0.9 (L) (06/08/17)  0.5 (06/08/17)  0.1 (06/08/17)  0.0    (06/07/ Medicaid plans. To get signed up and covered, please call [phone] and ask to get set up for an insurance coverage that [place] [address]. El[place]     Sign up for [employer], your secure online medical record.   [employer] [facility] What to report to your healthcare team: Pain, nausea/vomiting, no bowel movement in 2+ days, diarrhea           General Nerve Function Medications     Phenytoin Sodium Extended 100 MG Oral Cap    levETIRAcetam 500 MG Oral Tab       Use:  Treat conditions s